# Patient Record
Sex: MALE | Race: WHITE | NOT HISPANIC OR LATINO | Employment: UNEMPLOYED | URBAN - METROPOLITAN AREA
[De-identification: names, ages, dates, MRNs, and addresses within clinical notes are randomized per-mention and may not be internally consistent; named-entity substitution may affect disease eponyms.]

---

## 2019-06-01 ENCOUNTER — HOSPITAL ENCOUNTER (OUTPATIENT)
Dept: URGENT CARE | Facility: CLINIC | Age: 39
Discharge: HOME | End: 2019-06-01

## 2019-08-23 ENCOUNTER — HOSPITAL ENCOUNTER (OUTPATIENT)
Dept: URGENT CARE | Facility: CLINIC | Age: 39
Discharge: HOME | End: 2019-08-23

## 2019-08-29 LAB
E CHAFFEENSIS IGG TITR SER IF: NEGATIVE {TITER}
E. CHAFFEENSIS (HME) IGM TITER: NEGATIVE

## 2021-02-11 ENCOUNTER — HOSPITAL ENCOUNTER (OUTPATIENT)
Dept: URGENT CARE | Facility: CLINIC | Age: 41
Discharge: HOME | End: 2021-02-11
Attending: FAMILY MEDICINE

## 2021-02-11 LAB
ALBUMIN SERPL-MCNC: 4.6 G/DL (ref 3.5–5)
ALBUMIN/GLOB SERPL: 1.7 {RATIO} (ref 1.4–2.6)
ALP SERPL-CCNC: 56 U/L (ref 53–128)
ALT SERPL-CCNC: 25 U/L (ref 10–40)
ANION GAP SERPL CALC-SCNC: 14 MMOL/L (ref 8–19)
AST SERPL-CCNC: 27 U/L (ref 15–50)
BILIRUB SERPL-MCNC: 0.39 MG/DL (ref 0.2–1.3)
BUN SERPL-MCNC: 20 MG/DL (ref 5–25)
BUN/CREAT SERPL: 16 {RATIO} (ref 6–20)
CALCIUM SERPL-MCNC: 9.6 MG/DL (ref 8.7–10.4)
CHLORIDE SERPL-SCNC: 106 MMOL/L (ref 99–111)
CONV CO2: 28 MMOL/L (ref 22–32)
CONV TOTAL PROTEIN: 7.3 G/DL (ref 6.3–8.2)
CREAT UR-MCNC: 1.28 MG/DL (ref 0.7–1.2)
GFR SERPLBLD BASED ON 1.73 SQ M-ARVRAT: >60 ML/MIN/{1.73_M2}
GLOBULIN UR ELPH-MCNC: 2.7 G/DL (ref 2–3.5)
GLUCOSE BLD-MCNC: 89 MG/DL (ref 70–99)
GLUCOSE SERPL-MCNC: 98 MG/DL (ref 70–99)
OSMOLALITY SERPL CALC.SUM OF ELEC: 301 MOSM/KG (ref 273–304)
POTASSIUM SERPL-SCNC: 4.3 MMOL/L (ref 3.5–5.3)
SODIUM SERPL-SCNC: 144 MMOL/L (ref 135–147)
URATE SERPL-MCNC: 7.4 MG/DL (ref 3.5–8.5)

## 2021-02-13 LAB — BACTERIA UR CULT: NORMAL

## 2021-02-16 LAB
C TRACH RRNA CVX QL NAA+PROBE: NEGATIVE
N GONORRHOEA DNA SPEC QL NAA+PROBE: NEGATIVE

## 2024-06-01 ENCOUNTER — HOSPITAL ENCOUNTER (EMERGENCY)
Facility: HOSPITAL | Age: 44
Discharge: HOME | End: 2024-06-01
Attending: EMERGENCY MEDICINE
Payer: OTHER GOVERNMENT

## 2024-06-01 VITALS
HEART RATE: 70 BPM | BODY MASS INDEX: 25.63 KG/M2 | OXYGEN SATURATION: 99 % | SYSTOLIC BLOOD PRESSURE: 120 MMHG | HEIGHT: 68 IN | TEMPERATURE: 98.6 F | RESPIRATION RATE: 18 BRPM | WEIGHT: 169.09 LBS | DIASTOLIC BLOOD PRESSURE: 80 MMHG

## 2024-06-01 DIAGNOSIS — B34.9 ACUTE VIRAL SYNDROME: Primary | ICD-10-CM

## 2024-06-01 DIAGNOSIS — Z20.89 EXPOSURE TO MENINGITIS: ICD-10-CM

## 2024-06-01 LAB
ALBUMIN SERPL-MCNC: 4.6 G/DL (ref 3.5–5.2)
ALBUMIN/GLOB SERPL: 2 G/DL
ALP SERPL-CCNC: 65 U/L (ref 39–117)
ALT SERPL W P-5'-P-CCNC: 20 U/L (ref 1–41)
ANION GAP SERPL CALCULATED.3IONS-SCNC: 9.3 MMOL/L (ref 5–15)
AST SERPL-CCNC: 21 U/L (ref 1–40)
BASOPHILS # BLD AUTO: 0.04 10*3/MM3 (ref 0–0.2)
BASOPHILS NFR BLD AUTO: 0.7 % (ref 0–1.5)
BILIRUB SERPL-MCNC: 0.4 MG/DL (ref 0–1.2)
BUN SERPL-MCNC: 19 MG/DL (ref 6–20)
BUN/CREAT SERPL: 15.7 (ref 7–25)
CALCIUM SPEC-SCNC: 9.2 MG/DL (ref 8.6–10.5)
CHLORIDE SERPL-SCNC: 104 MMOL/L (ref 98–107)
CO2 SERPL-SCNC: 26.7 MMOL/L (ref 22–29)
CREAT SERPL-MCNC: 1.21 MG/DL (ref 0.76–1.27)
DEPRECATED RDW RBC AUTO: 42.9 FL (ref 37–54)
EGFRCR SERPLBLD CKD-EPI 2021: 76.2 ML/MIN/1.73
EOSINOPHIL # BLD AUTO: 0.24 10*3/MM3 (ref 0–0.4)
EOSINOPHIL NFR BLD AUTO: 4.3 % (ref 0.3–6.2)
ERYTHROCYTE [DISTWIDTH] IN BLOOD BY AUTOMATED COUNT: 12.7 % (ref 12.3–15.4)
GLOBULIN UR ELPH-MCNC: 2.3 GM/DL
GLUCOSE SERPL-MCNC: 106 MG/DL (ref 65–99)
HCT VFR BLD AUTO: 36.8 % (ref 37.5–51)
HGB BLD-MCNC: 12.6 G/DL (ref 13–17.7)
HOLD SPECIMEN: NORMAL
HOLD SPECIMEN: NORMAL
IMM GRANULOCYTES # BLD AUTO: 0.01 10*3/MM3 (ref 0–0.05)
IMM GRANULOCYTES NFR BLD AUTO: 0.2 % (ref 0–0.5)
LYMPHOCYTES # BLD AUTO: 1.99 10*3/MM3 (ref 0.7–3.1)
LYMPHOCYTES NFR BLD AUTO: 35.4 % (ref 19.6–45.3)
MCH RBC QN AUTO: 31.4 PG (ref 26.6–33)
MCHC RBC AUTO-ENTMCNC: 34.2 G/DL (ref 31.5–35.7)
MCV RBC AUTO: 91.8 FL (ref 79–97)
MONOCYTES # BLD AUTO: 0.41 10*3/MM3 (ref 0.1–0.9)
MONOCYTES NFR BLD AUTO: 7.3 % (ref 5–12)
NEUTROPHILS NFR BLD AUTO: 2.93 10*3/MM3 (ref 1.7–7)
NEUTROPHILS NFR BLD AUTO: 52.1 % (ref 42.7–76)
NRBC BLD AUTO-RTO: 0 /100 WBC (ref 0–0.2)
PLATELET # BLD AUTO: 212 10*3/MM3 (ref 140–450)
PMV BLD AUTO: 9.6 FL (ref 6–12)
POTASSIUM SERPL-SCNC: 3.8 MMOL/L (ref 3.5–5.2)
PROT SERPL-MCNC: 6.9 G/DL (ref 6–8.5)
RBC # BLD AUTO: 4.01 10*6/MM3 (ref 4.14–5.8)
SODIUM SERPL-SCNC: 140 MMOL/L (ref 136–145)
WBC NRBC COR # BLD AUTO: 5.62 10*3/MM3 (ref 3.4–10.8)
WHOLE BLOOD HOLD COAG: NORMAL
WHOLE BLOOD HOLD SPECIMEN: NORMAL

## 2024-06-01 PROCEDURE — 80053 COMPREHEN METABOLIC PANEL: CPT

## 2024-06-01 PROCEDURE — 99283 EMERGENCY DEPT VISIT LOW MDM: CPT

## 2024-06-01 PROCEDURE — 36415 COLL VENOUS BLD VENIPUNCTURE: CPT

## 2024-06-01 PROCEDURE — 85025 COMPLETE CBC W/AUTO DIFF WBC: CPT

## 2024-06-01 RX ORDER — ONDANSETRON 4 MG/1
4 TABLET, ORALLY DISINTEGRATING ORAL 4 TIMES DAILY PRN
Qty: 9 TABLET | Refills: 0 | Status: SHIPPED | OUTPATIENT
Start: 2024-06-01

## 2024-06-01 RX ORDER — ONDANSETRON 4 MG/1
4 TABLET, ORALLY DISINTEGRATING ORAL ONCE
Status: DISCONTINUED | OUTPATIENT
Start: 2024-06-01 | End: 2024-06-02 | Stop reason: HOSPADM

## 2024-06-02 NOTE — ED PROVIDER NOTES
Time: 8:14 PM EDT  Date of encounter:  6/1/2024  Independent Historian/Clinical History and Information was obtained by:   Patient    History is limited by: N/A    Chief Complaint   Patient presents with    Headache    Fatigue    Back Pain         History of Present Illness:  Patient is a 43 y.o. year old male who presents to the emergency department for evaluation of headache.  Patient states for the past few days he has had a headache, backache, and fatigue.  Patient denies any fever.  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  (Provider in triage, Wesley Ashraf PA-C)    Patient denies any fevers or chills.  States his main concern is due to his exposure and now that his daughter has nausea vomiting headache myalgias sore throat and earache he became alarmed.    I was able to review the index patient's/grandmother medical record she tested positive for streptococcal pneumonia meningitis likely due to direct extension from ethmoids and mastoids.    Patient Care Team  Primary Care Provider: Chandler Pimentel DO    Past Medical History:     No Known Allergies  Past Medical History:   Diagnosis Date    Anxiety     Depression     Gout     Kidney stones     PTSD (post-traumatic stress disorder)      Past Surgical History:   Procedure Laterality Date    APPENDECTOMY       Family History   Problem Relation Age of Onset    No Known Problems Mother     Kidney failure Father        Home Medications:  Prior to Admission medications    Medication Sig Start Date End Date Taking? Authorizing Provider   albuterol sulfate  (90 Base) MCG/ACT inhaler Inhale 2 puffs Every 6 (Six) Hours As Needed for Wheezing. 4/5/22   Eunice Jones APRN   vitamin C (ASCORBIC ACID) 250 MG tablet Take 1 tablet by mouth Daily.    Emergency, Nurse Christopher RN   lamoTRIgine (LaMICtal) 25 MG tablet Take 25 mg by mouth Daily.  4/5/22  Emergency, Nurse Christopher RN   traZODone  "(DESYREL) 50 MG tablet Take 25 mg by mouth Every Night.  9/3/22  Emergency, Nurse Christopher, RN        Social History:   Social History     Tobacco Use    Smoking status: Never     Passive exposure: Never    Smokeless tobacco: Never   Vaping Use    Vaping status: Never Used   Substance Use Topics    Alcohol use: Never    Drug use: Never         Review of Systems:  Review of Systems   Constitutional:  Negative for chills and fever.   HENT:  Negative for congestion, ear pain and sore throat.    Eyes:  Negative for pain.   Respiratory:  Negative for cough, chest tightness and shortness of breath.    Cardiovascular:  Negative for chest pain.   Gastrointestinal:  Positive for nausea. Negative for abdominal pain, diarrhea and vomiting.   Genitourinary:  Negative for flank pain and hematuria.   Musculoskeletal:  Positive for myalgias. Negative for joint swelling.   Skin:  Negative for pallor.   Neurological:  Positive for headaches. Negative for seizures.   All other systems reviewed and are negative.       Physical Exam:  /80 (BP Location: Right arm, Patient Position: Sitting)   Pulse 70   Temp 98.6 °F (37 °C) (Oral)   Resp 18   Ht 172.7 cm (68\")   Wt 76.7 kg (169 lb 1.5 oz)   SpO2 99%   BMI 25.71 kg/m²         Physical Exam  Vitals and nursing note reviewed.   Constitutional:       General: He is not in acute distress.     Appearance: Normal appearance. He is not toxic-appearing.   HENT:      Head: Normocephalic and atraumatic.      Jaw: There is normal jaw occlusion.   Eyes:      General: Lids are normal.      Extraocular Movements: Extraocular movements intact.      Conjunctiva/sclera: Conjunctivae normal.      Pupils: Pupils are equal, round, and reactive to light.   Cardiovascular:      Rate and Rhythm: Normal rate and regular rhythm.      Pulses: Normal pulses.      Heart sounds: Normal heart sounds.   Pulmonary:      Effort: Pulmonary effort is normal. No respiratory distress.      Breath sounds: Normal " breath sounds. No wheezing or rhonchi.   Abdominal:      General: Abdomen is flat. There is no distension.      Palpations: Abdomen is soft.      Tenderness: There is no abdominal tenderness. There is no guarding or rebound.   Musculoskeletal:         General: Normal range of motion.      Cervical back: Normal range of motion and neck supple.      Right lower leg: No edema.      Left lower leg: No edema.   Skin:     General: Skin is warm and dry.      Coloration: Skin is not cyanotic.   Neurological:      Mental Status: He is alert and oriented to person, place, and time. Mental status is at baseline.   Psychiatric:         Attention and Perception: Attention and perception normal.         Mood and Affect: Mood normal.               Procedures:  Procedures      Medical Decision Making:      Comorbidities that affect care:    Anxiety, depression, PTSD, gout    External Notes reviewed:    Previous Clinic Note: Outpatient urgent care visit for right foot pain November 2023      The following orders were placed and all results were independently analyzed by me:  Orders Placed This Encounter   Procedures    COVID-19, FLU A/B, RSV PCR 1 HR TAT - Swab, Nasopharynx    Rapid Strep A Screen - Swab, Throat    Comprehensive Metabolic Panel    Mobile Draw    CBC Auto Differential    CBC & Differential    Green Top (Gel)    Lavender Top    Gold Top - SST    Light Blue Top       Medications Given in the Emergency Department:  Medications - No data to display       ED Course:    The patient was initially evaluated in the triage area where orders were placed. The patient was later dispositioned by Adebayo Avelar MD.      The patient was advised to stay for completion of workup which includes but is not limited to communication of labs and radiological results, reassessment and plan. The patient was advised that leaving prior to disposition by a provider could result in critical findings that are not communicated to the patient.      ED Course as of 06/02/24 0654   Sat Jun 01, 2024 2014 PROVIDER IN TRIAGE  Patient was evaluated by me in triage, Wesley Ashraf PA-C.  Orders were placed and patient is currently awaiting final results and disposition.  [MD]      ED Course User Index  [MD] Wesley Ashraf PA-C       Labs:    Lab Results (last 24 hours)       Procedure Component Value Units Date/Time    CBC & Differential [[id number]]  (Abnormal) Collected: 06/01/24 2030    Specimen: Blood Updated: 06/01/24 2038    Narrative:      The following orders were created for panel order CBC & Differential.  Procedure                               Abnormality         Status                     ---------                               -----------         ------                     CBC Auto Differential[[id number]]        Abnormal            Final result                 Please view results for these tests on the individual orders.    Comprehensive Metabolic Panel [[id number]]  (Abnormal) Collected: 06/01/24 2030    Specimen: Blood Updated: 06/01/24 2103     Glucose 106 mg/dL      BUN 19 mg/dL      Creatinine 1.21 mg/dL      Sodium 140 mmol/L      Potassium 3.8 mmol/L      Chloride 104 mmol/L      CO2 26.7 mmol/L      Calcium 9.2 mg/dL      Total Protein 6.9 g/dL      Albumin 4.6 g/dL      ALT (SGPT) 20 U/L      AST (SGOT) 21 U/L      Alkaline Phosphatase 65 U/L      Total Bilirubin 0.4 mg/dL      Globulin 2.3 gm/dL      A/G Ratio 2.0 g/dL      BUN/Creatinine Ratio 15.7     Anion Gap 9.3 mmol/L      eGFR 76.2 mL/min/1.73     Narrative:      GFR Normal >60  Chronic Kidney Disease <60  Kidney Failure <15      CBC Auto Differential [[id number]]  (Abnormal) Collected: 06/01/24 2030    Specimen: Blood Updated: 06/01/24 2038     WBC 5.62 10*3/mm3      RBC 4.01 10*6/mm3      Hemoglobin 12.6 g/dL      Hematocrit 36.8 %      MCV 91.8 fL      MCH 31.4 pg      MCHC 34.2 g/dL      RDW 12.7 %      RDW-SD 42.9 fl      MPV 9.6 fL      Platelets 212 10*3/mm3       Neutrophil % 52.1 %      Lymphocyte % 35.4 %      Monocyte % 7.3 %      Eosinophil % 4.3 %      Basophil % 0.7 %      Immature Grans % 0.2 %      Neutrophils, Absolute 2.93 10*3/mm3      Lymphocytes, Absolute 1.99 10*3/mm3      Monocytes, Absolute 0.41 10*3/mm3      Eosinophils, Absolute 0.24 10*3/mm3      Basophils, Absolute 0.04 10*3/mm3      Immature Grans, Absolute 0.01 10*3/mm3      nRBC 0.0 /100 WBC              Imaging:    No Radiology Exams Resulted Within Past 24 Hours      Differential Diagnosis and Discussion:      Headache: Differential diagnosis includes but is not limited to migraine, cluster headache, hypertension, tumor, subarachnoid bleeding, pseudotumor cerebri, temporal arteritis, infections, tension headache, and TMJ syndrome.    All labs were reviewed and interpreted by me.    MDM  Number of Diagnoses or Management Options  Acute viral syndrome  Exposure to meningitis  Diagnosis management comments: Patient is awake alert able to provide a complete detailed history.  He has no evidence of nuchal rigidity or neck stiffness.  He has no fever.  White blood cell counts within normal limits.  It appears the index case was due to streptococcal meningitis and not Neisseria.  I believe the patient is at very low risk for meningitis at this time and his symptoms likely represent an acute viral syndrome due to his multiple encounters with the healthcare system over the past week due to his ill family member.       Amount and/or Complexity of Data Reviewed  Clinical lab tests: reviewed                 Patient Care Considerations:    ANTIBIOTICS: I considered prescribing antibiotics as an outpatient however no bacterial focus of infection was found.      Consultants/Shared Management Plan:    None    Social Determinants of Health:    Patient is independent, reliable, and has access to care.       Disposition and Care Coordination:    Discharged: The patient is suitable and stable for discharge with no need  for consideration of admission.    I have explained the patient´s condition, diagnoses and treatment plan based on the information available to me at this time. I have answered questions and addressed any concerns. The patient has a good  understanding of the patient´s diagnosis, condition, and treatment plan as can be expected at this point. The vital signs have been stable. The patient´s condition is stable and appropriate for discharge from the emergency department.      The patient will pursue further outpatient evaluation with the primary care physician or other designated or consulting physician as outlined in the discharge instructions. They are agreeable to this plan of care and follow-up instructions have been explained in detail. The patient has received these instructions in written format and has expressed an understanding of the discharge instructions. The patient is aware that any significant change in condition or worsening of symptoms should prompt an immediate return to this or the closest emergency department or call to 911.    Final diagnoses:   Acute viral syndrome   Exposure to meningitis        ED Disposition       ED Disposition   Discharge    Condition   Stable    Comment   --               This medical record created using voice recognition software.             Adebayo Avelar MD  06/02/24 0679

## 2024-06-02 NOTE — ED TRIAGE NOTES
Pt to ED from home with reports of exposure to mother who has bacterial meningitis eight days ago.      Pt states today he has headache, back pain, and generalized fatigue.      Full ROM, no petechial rash noted in triage.

## 2025-02-26 PROCEDURE — 87070 CULTURE OTHR SPECIMN AEROBIC: CPT | Performed by: NURSE PRACTITIONER

## 2025-02-26 PROCEDURE — 87205 SMEAR GRAM STAIN: CPT | Performed by: NURSE PRACTITIONER

## 2025-02-28 ENCOUNTER — TELEPHONE (OUTPATIENT)
Dept: URGENT CARE | Facility: CLINIC | Age: 45
End: 2025-02-28
Payer: OTHER GOVERNMENT

## 2025-02-28 NOTE — TELEPHONE ENCOUNTER
I called back to the patient in regards to the phone call he had this morning about changing an antibiotic.  Apparently he was on Bactrim and he has been told he has some kidney dysfunction stop taking it.  Was waiting on the culture.  I called and left a message for him to call back.

## 2025-07-21 ENCOUNTER — HOSPITAL ENCOUNTER (EMERGENCY)
Facility: HOSPITAL | Age: 45
Discharge: HOME | End: 2025-07-21
Attending: EMERGENCY MEDICINE
Payer: OTHER GOVERNMENT

## 2025-07-21 PROCEDURE — 99211 OFF/OP EST MAY X REQ PHY/QHP: CPT | Performed by: EMERGENCY MEDICINE
